# Patient Record
Sex: FEMALE | URBAN - METROPOLITAN AREA
[De-identification: names, ages, dates, MRNs, and addresses within clinical notes are randomized per-mention and may not be internally consistent; named-entity substitution may affect disease eponyms.]

---

## 2021-02-03 ENCOUNTER — OFFICE VISIT (OUTPATIENT)
Dept: URBAN - METROPOLITAN AREA CLINIC 76 | Facility: CLINIC | Age: 71
End: 2021-02-03
Payer: COMMERCIAL

## 2021-02-03 DIAGNOSIS — H25.13 AGE-RELATED NUCLEAR CATARACT, BILATERAL: ICD-10-CM

## 2021-02-03 DIAGNOSIS — H52.4 PRESBYOPIA: Primary | ICD-10-CM

## 2021-02-03 PROCEDURE — 92310 CONTACT LENS FITTING OU: CPT | Performed by: OPTOMETRIST

## 2021-02-03 PROCEDURE — 92004 COMPRE OPH EXAM NEW PT 1/>: CPT | Performed by: OPTOMETRIST

## 2021-02-03 ASSESSMENT — INTRAOCULAR PRESSURE
OS: 11
OD: 14

## 2021-02-03 ASSESSMENT — VISUAL ACUITY
OD: 20/25
OS: 20/25

## 2021-02-03 ASSESSMENT — KERATOMETRY
OS: 44.38
OD: 44.13

## 2021-02-03 NOTE — IMPRESSION/PLAN
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 Bilateral. Plan: Discussed diagnosis with patient. Dispensed new MRx today. Order ctls trials. If pt happy with comfort and vision w/ ctls, ok to finalize. Pt to call with any concerns.